# Patient Record
Sex: FEMALE | Race: WHITE | Employment: STUDENT | URBAN - METROPOLITAN AREA
[De-identification: names, ages, dates, MRNs, and addresses within clinical notes are randomized per-mention and may not be internally consistent; named-entity substitution may affect disease eponyms.]

---
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
## 2024-02-01 ENCOUNTER — VIRTUAL PHONE E/M (OUTPATIENT)
Dept: INTERNAL MEDICINE CLINIC | Facility: CLINIC | Age: 19
End: 2024-02-01

## 2024-02-01 ENCOUNTER — TELEPHONE (OUTPATIENT)
Dept: INTERNAL MEDICINE CLINIC | Facility: CLINIC | Age: 19
End: 2024-02-01

## 2024-02-01 DIAGNOSIS — J01.00 ACUTE NON-RECURRENT MAXILLARY SINUSITIS: Primary | ICD-10-CM

## 2024-02-01 PROCEDURE — 99441 PHONE E/M BY PHYS 5-10 MIN: CPT | Performed by: INTERNAL MEDICINE

## 2024-02-01 RX ORDER — AMOXICILLIN 500 MG/1
500 CAPSULE ORAL 3 TIMES DAILY
Qty: 21 CAPSULE | Refills: 0 | Status: SHIPPED | OUTPATIENT
Start: 2024-02-01

## 2024-02-01 NOTE — TELEPHONE ENCOUNTER
Needs note for school stating she was seen today and is currently being treated with medication-has been ill since 1/29/24    Please call mother Dayan @ [phone] when ready for pickup

## 2024-02-01 NOTE — PROGRESS NOTES
Subjective:   Patient ID: Grace Ace is a 19 year old female.    Cough  This is a new problem. The current episode started in the past 7 days. The problem has been gradually worsening. The problem occurs every few minutes. The cough is Productive of sputum. Associated symptoms include nasal congestion and a sore throat. Pertinent negatives include no chills, postnasal drip, rash, rhinorrhea, shortness of breath, sweats, weight loss or wheezing. The symptoms are aggravated by lying down. She has tried rest for the symptoms.       History/Other:   Review of Systems   Constitutional:  Negative for chills and weight loss.   HENT:  Positive for sore throat. Negative for postnasal drip and rhinorrhea.    Respiratory:  Positive for cough. Negative for shortness of breath and wheezing.    Skin:  Negative for rash.   All other systems reviewed and are negative.    Current Outpatient Medications   Medication Sig Dispense Refill    methylPREDNISolone (MEDROL) 4 MG Oral Tablet Therapy Pack As directed. 1 each 0     Allergies:  Allergies   Allergen Reactions    Pineapple Coughing, ITCHING and Tightness in Throat       Objective:   Physical Exam  Constitutional:       General: She is not in acute distress.  Pulmonary:      Comments: speaks in full sentences w/o distress            Assessment & Plan:   1. Acute non-recurrent maxillary sinusitis    - finish amox as ordered    No orders of the defined types were placed in this encounter.      Meds This Visit:  Requested Prescriptions      No prescriptions requested or ordered in this encounter       Imaging & Referrals:  None

## 2024-03-27 ENCOUNTER — OFFICE VISIT (OUTPATIENT)
Dept: INTERNAL MEDICINE CLINIC | Facility: CLINIC | Age: 19
End: 2024-03-27

## 2024-03-27 VITALS
OXYGEN SATURATION: 97 % | TEMPERATURE: 97 F | DIASTOLIC BLOOD PRESSURE: 82 MMHG | SYSTOLIC BLOOD PRESSURE: 120 MMHG | RESPIRATION RATE: 16 BRPM | HEIGHT: 69 IN | HEART RATE: 81 BPM | BODY MASS INDEX: 33.33 KG/M2 | WEIGHT: 225 LBS

## 2024-03-27 DIAGNOSIS — R19.7 NAUSEA VOMITING AND DIARRHEA: Primary | ICD-10-CM

## 2024-03-27 DIAGNOSIS — R10.821 RIGHT UPPER QUADRANT ABDOMINAL TENDERNESS WITH REBOUND TENDERNESS: ICD-10-CM

## 2024-03-27 DIAGNOSIS — R11.2 NAUSEA VOMITING AND DIARRHEA: Primary | ICD-10-CM

## 2024-03-27 PROCEDURE — 99214 OFFICE O/P EST MOD 30 MIN: CPT | Performed by: NURSE PRACTITIONER

## 2024-03-27 NOTE — PROGRESS NOTES
HPI:    Patient ID: Grace Ace is a 19 year old female.    Chief Complaint   Patient presents with    Stomach Pain     Diarrhea, vomiting after eating, nauseous   Aug 2023         Goes to [employer]. When away at school and eating in the cafeteria she is getting nauseated, vomiting. She will have diarrhea when at school. When she is at home normal GI. No vomiting, no diarrhea.         Review of Systems      History reviewed. No pertinent past medical history.  History reviewed. No pertinent surgical history.  History reviewed. No pertinent family history.  Social History     Socioeconomic History    Marital status: Single   Tobacco Use    Smoking status: Never    Smokeless tobacco: Never   Vaping Use    Vaping Use: Never used   Substance and Sexual Activity    Alcohol use: Never          No current outpatient medications on file.     Allergies:  Allergies   Allergen Reactions    Pineapple Coughing, ITCHING and Tightness in Throat       Lab Results   Component Value Date    GLU 86 12/21/2017    BUN 8 12/21/2017    CREATSERUM 0.49 (L) 12/21/2017     12/21/2017    CA 8.8 (L) 12/21/2017    ALKPHO 153 12/21/2017    AST 21 12/21/2017    ALT 21 12/21/2017    BILT 0.3 12/21/2017    TP 7.5 12/21/2017    ALB 3.8 12/21/2017     12/21/2017    K 3.7 12/21/2017     12/21/2017    CO2 21.0 (L) 12/21/2017     Lab Results   Component Value Date    WBC 14.1 (H) 12/21/2017    RBC 4.79 12/21/2017    HGB 13.4 12/21/2017    HCT 39.7 12/21/2017    MCV 82.9 12/21/2017    MCH 28.0 12/21/2017    MCHC 33.8 12/21/2017    RDW 12.7 12/21/2017    .0 12/21/2017     No results found for: \"CHOLEST\", \"TRIG\", \"HDL\", \"LDL\", \"VLDL\", \"TCHDLRATIO\", \"NONHDLC\", \"CHOLHDLRATIO\", \"CALCNONHDL\"  No results found for: \"EAG\", \"A1C\"  No results found for: \"T4F\", \"TSH\", \"TSHT4\"  No results found for: \"VITD\", \"QVITD\", \"DTKS80UI\"  No results found for: \"CRISTAL\"  No results found for: \"FOLIC\", \"FOLATESER\", \"FOLATE\"  No  results found for: \"IRON\", \"IRONTOT\"  No results found for: \"B12\", \"VITB12\"  No results found for: \"PHOS\", \"PHOSPHORUS\"  No results found for: \"MG\"     PHYSICAL EXAM:   /82   Pulse 81   Temp 97 °F (36.1 °C)   Resp 16   Ht 5' 9\" (1.753 m)   Wt 225 lb (102.1 kg)   LMP 03/09/2024 (Exact Date)   SpO2 97%   BMI 33.23 kg/m²   Physical Exam  Vitals and nursing note reviewed.   Constitutional:       Appearance: She is obese.   Cardiovascular:      Rate and Rhythm: Normal rate and regular rhythm.      Pulses: Normal pulses.   Pulmonary:      Effort: Pulmonary effort is normal. No respiratory distress.      Breath sounds: Normal breath sounds.   Abdominal:      General: There is no distension.      Palpations: Abdomen is soft.      Tenderness: There is abdominal tenderness in the right upper quadrant.   Neurological:      Mental Status: She is alert.   Psychiatric:         Mood and Affect: Mood normal.              ASSESSMENT/PLAN:   Diagnoses and all orders for this visit:    Nausea vomiting and diarrhea  -     US ABDOMEN COMPLETE (CPT=76700); Future    Right upper quadrant abdominal tenderness with rebound tenderness  -     US ABDOMEN COMPLETE (CPT=76700); Future        JULIANA Rashid

## 2025-06-04 ENCOUNTER — OFFICE VISIT (OUTPATIENT)
Dept: INTERNAL MEDICINE CLINIC | Facility: CLINIC | Age: 20
End: 2025-06-04

## 2025-06-04 VITALS
HEART RATE: 112 BPM | OXYGEN SATURATION: 100 % | RESPIRATION RATE: 18 BRPM | WEIGHT: 220 LBS | HEIGHT: 69 IN | DIASTOLIC BLOOD PRESSURE: 68 MMHG | SYSTOLIC BLOOD PRESSURE: 104 MMHG | TEMPERATURE: 97 F | BODY MASS INDEX: 32.58 KG/M2

## 2025-06-04 DIAGNOSIS — Z12.4 CERVICAL CANCER SCREENING: ICD-10-CM

## 2025-06-04 DIAGNOSIS — Z01.84 IMMUNITY STATUS TESTING: ICD-10-CM

## 2025-06-04 DIAGNOSIS — Z00.00 ANNUAL PHYSICAL EXAM: Primary | ICD-10-CM

## 2025-06-04 PROCEDURE — 99395 PREV VISIT EST AGE 18-39: CPT | Performed by: NURSE PRACTITIONER

## 2025-06-04 NOTE — PROGRESS NOTES
The following individual(s) verbally consented to be recorded using ambient AI listening technology and understand that they can each withdraw their consent to this listening technology at any point by asking the clinician to turn off or pause the recording:    Patient name: Grace Ivonemaurice Ace  Additional names:  None

## 2025-06-04 NOTE — PROGRESS NOTES
Wellness Exam    CC: Patient is presenting for a wellness exam    HPI:   Concerns: starting nursing clinicals! Paperwork completed today  History of Present Illness  Grace Ace is a 20 year old female who presents for a routine physical exam and immunization titers for clinical placement.    She requires immunization titers for measles, mumps, varicella, TB, hepatitis B, Tdap, and polio. Due to an allergy to the pertussis component of the Tdap vaccine, she receives the DT vaccine instead, with the last dose in May 2017. Her menstrual cycles have been irregular for 18 months, but she is not sexually active and has no urinary or vaginal symptoms. Increased earwax production is noted since an ear infection a year ago, without hearing loss. She experiences no numbness, tingling, swelling, or orthopedic issues. She is a student at [employer], maintaining academic performance within program requirements.        Pertinent Family History: Family History[1]     Gyne:   refer to gyne  Health Maintenance   Topic Date Due    Pap Smear  Never done            Denies pelvic pain, abnormal discharge or genital lesions. Irregular periods        Reported Health: Good.  Diet is general, exercise: routine.    Past Medical History[2]  Past Surgical History[3]  Short Social Hx on File[4]  Medications Ordered Prior to Encounter[5]    Review of Systems   Constitutional: Negative for fever, chills and fatigue.   HENT: Negative for hearing loss, congestion, sore throat and neck pain.    Eyes: Negative for pain and visual disturbance.   Respiratory: Negative for cough and shortness of breath.    Cardiovascular: Negative for chest pain and palpitations.   Gastrointestinal: Negative for nausea, vomiting, abdominal pain and diarrhea.   Genitourinary: Negative for urgency, frequency of urination, and abnormal vaginal bleeding.   Musculoskeletal: Negative for arthralgias and gait problem.   Skin: Negative for color  change and rash.   Neurological: Negative for tremors, weakness and numbness.   Hematological: Negative for adenopathy. Does not bruise/bleed easily.   Psychiatric/Behavioral: Negative for confusion and agitation. The patient is not nervous/anxious.      /68   Pulse 112   Temp 97.1 °F (36.2 °C) (Temporal)   Resp 18   Ht 5' 9\" (1.753 m)   Wt 220 lb (99.8 kg)   LMP 05/27/2025   SpO2 100%   BMI 32.49 kg/m²   Physical Exam   Constitutional: She is oriented to person, place, and time. She appears well-developed. No distress.   Head: Normocephalic and atraumatic.   Eyes: EOM are normal. Pupils are equal, round, and reactive to light. No scleral icterus. Fundoscopic exam: No hemorrhages, A/V nicking, exudates or papilledema.  ENT: TM's clear, nose normal, no oropharyngeal exudates or tonsillar hypertrophy    Neck: Normal range of motion. No thyromegaly present.   Cardiovascular: Normal rate, regular rhythm and normal heart sounds.  No murmur or friction rub heard.  Pulmonary/Chest: Effort normal and breath sounds normal bilaterally. She has no wheezes or rales.   Breasts: defer to gyne  Abdominal: Soft. Bowel sounds are normal. There is no tenderness. No HSM.  Musculoskeletal: Normal range of motion. She exhibits no edema.   Lymphadenopathy: She has no cervical, supraclavicular, or axillary adenopathy.   : defer to gyne  Neurological: She is alert and oriented to person, place, and time. DTRs are +2 and symmetric. Cranial nerves grossly intact.  Skin: Skin is warm. No rash noted. No erythema, pallor or jaundice.   Psychiatric: She has a normal mood and affect and her behavior is normal.     Assessment and Plan:  Grace Ace is a 20 year old female here for a wellness exam.  Age appropriate cancer screening, labs, safety, immunizations were discussed with the patient and ordered as follows:      Orders Placed This Encounter   Procedures    Varicella IgG ([employer] Titer) [E]     Standing Status:    Future     Expected Date:   6/4/2025     Expiration Date:   6/4/2026     Release to patient:   Immediate    Immune Status Panel,MMR     Standing Status:   Future     Expected Date:   6/4/2025     Expiration Date:   6/4/2026     Release to patient:   Immediate    Quantiferon TB Plus     Standing Status:   Future     Expected Date:   6/4/2025     Expiration Date:   6/4/2026     Release to patient:   Immediate    Hepatitis B Surface Antibody [E]     Standing Status:   Future     Expected Date:   6/4/2025     Expiration Date:   6/4/2026     Release to patient:   Immediate    CBC With Differential With Platelet     Standing Status:   Future     Expected Date:   6/4/2025     Expiration Date:   6/4/2026    Comp Metabolic Panel (14)     Standing Status:   Future     Expected Date:   6/4/2025     Expiration Date:   5/30/2026    Lipid Panel     Standing Status:   Future     Expected Date:   6/4/2025     Expiration Date:   5/30/2026    TSH W Reflex To Free T4     Standing Status:   Future     Expected Date:   6/4/2025     Expiration Date:   5/30/2026    Urinalysis, Routine     Standing Status:   Future     Expected Date:   6/4/2025     Expiration Date:   6/4/2026         recommend regular cardiovascular and weight bearing exercise as well as a well-rounded diet.    Her 5 year prevention plan includes: annual physical and labs, 30 minutes exercise most days of week and heart healthy diet  Patient/Caregiver Education:  Patient/Caregiver Education: There are no barriers to learning. Medical education done.  Outcome: Patient verbalizes understanding.       Educated by: CAMELIA Pina          [1] History reviewed. No pertinent family history.  [2] History reviewed. No pertinent past medical history.  [3] History reviewed. No pertinent surgical history.  [4]   Social History  Socioeconomic History    Marital status: Single   Tobacco Use    Smoking status: Never    Smokeless tobacco: Never   Vaping Use    Vaping status: Never  Used   Substance and Sexual Activity    Alcohol use: Yes     Comment: Occasionally    Drug use: Never   [5]   No current outpatient medications on file prior to visit.     No current facility-administered medications on file prior to visit.

## 2025-06-24 ENCOUNTER — PATIENT MESSAGE (OUTPATIENT)
Dept: INTERNAL MEDICINE CLINIC | Facility: CLINIC | Age: 20
End: 2025-06-24

## 2025-06-26 NOTE — TELEPHONE ENCOUNTER
Lab results received via fax  Reviewed by Melissa DENISE  Patient is immune to MMR, Varicella, need Hep B series, not immune to hep B  TB screening is negative  Blood count, glucose, kidney, liver, electrolytes, protein levels, lipid, thyroid all WNL  Patient notified, stated she will hold on to receiving Hep B series, still talking to her college and may sign waiver to not receive it since she already had series and not still not immune  Form completed for school and place in front office for     Melissa DENISE was updated on Patient refused Hep B series at this time

## 2025-07-02 ENCOUNTER — NURSE ONLY (OUTPATIENT)
Dept: INTERNAL MEDICINE CLINIC | Facility: CLINIC | Age: 20
End: 2025-07-02

## 2025-07-02 DIAGNOSIS — Z23 NEED FOR HEPATITIS B VACCINATION: Primary | ICD-10-CM

## 2025-07-02 PROCEDURE — 90471 IMMUNIZATION ADMIN: CPT | Performed by: INTERNAL MEDICINE

## 2025-07-02 PROCEDURE — 90746 HEPB VACCINE 3 DOSE ADULT IM: CPT | Performed by: INTERNAL MEDICINE

## 2025-07-02 NOTE — PROGRESS NOTES
Patient states she is here to receive Hepatitis B vaccine booster. Vaccine administered to right deltoid, patient tolerated well.

## 2025-08-29 ENCOUNTER — PATIENT MESSAGE (OUTPATIENT)
Dept: INTERNAL MEDICINE CLINIC | Facility: CLINIC | Age: 20
End: 2025-08-29

## (undated) NOTE — LETTER
05/10/23      Regis Hansen  : 2005        The above patient was seen for a medical condition on May 6th. Please excuse patient from work May 5th-May 11th. The above patient may return to work duty on Friday, May 12th.         Sincerely,    Sasha Ratliff PA-C

## (undated) NOTE — LETTER
Date: 2/1/2024    Patient Name: Grace Ace    YOB: 2005      To Whom it may concern:    The above patient was seen at the [facility] for treatment of a medical condition.    This patient should be excused from attending school from 1/29/24 through 2/3/24.    The patient may return to school on 2/4/24 with the following limitations none.        Sincerely,        Eloy Rockwell MD

## (undated) NOTE — LETTER
Date: 3/27/2024    Patient Name: Grace Ace  1/20/2005    To Whom it may concern:    The above patient was seen at [facility] for treatment of a medical condition. Due to her medical condition she should be given priority for easy access to personal preparation of meals.     Sincerely,    JULIANA Rashid

## (undated) NOTE — LETTER
21    Grace Nagimadelaine RILEY 2005     To Whom it May Concern,    The above patient is currently under my medical care. She was seen for an annual physical today, 21.          Sincerely                 Sasha Ratliff PA-C

## (undated) NOTE — LETTER
09/11/23      Rola Lopez  01/20/2005        The above patient was seen for a medical condition. She may return to school with a tentative return date of 09/12 or 09/13 and will be required to wear a mask until 09/14.     Sincerely,    CAMELIA Velazquez

## (undated) NOTE — LETTER
22      Grace RILEY 2005      To Whom it may Concern: The above patient was seen today 22 for a general physical examination. She is considered in good health at this time.           Sincerely,                 Sasha Ratliff PA-C

## (undated) NOTE — LETTER
Date: 2/1/2024    Patient Name: Grace Ace    YOB: 2005        To Whom it may concern:    The above patient was seen at the [facility] for treatment of a medical condition.    This patient should be excused from attending school from 1/29/24 through 2/1/24.    The patient may return to school on 2/2/24 with the following limitations none.        Sincerely,        Eloy Rockwell MD

## (undated) NOTE — ED AVS SNAPSHOT
Kwadwo Espinal   MRN: [id number]    Department:  [facility] Emergency Department   Date of Visit:  12/21/2017           Disclosure     Insurance plans vary and the physician(s) referred by the ER may not be covered by your plan.  Please contact you tell this physician (or your personal doctor if your instructions are to return to your personal doctor) about any new or lasting problems. The primary care or specialist physician will see patients referred from the [facility] Emergency Department.  Jeyson Erwin

## (undated) NOTE — LETTER
22      Grace Stallworth   2005       To Whom it may Concern: The above patient is currently under my medical care. She currently suffers from a medical condition that causes episodic nausea and vomiting. This medical condition is not contagious and she should not be restricted from work at this time.          Sincerely,             Sasha Ratliff PA-C

## (undated) NOTE — LETTER
Date: 11/8/2023    Patient Name: Ashely Lauren  1/20/2005    To Whom it may concern: The above patient was seen at the [facility] for treatment of a medical condition. This patient should be excused from attending school through November 10, 2023. Please contact my office with any questions or concerns at [phone].         Sincerely,    JULIANA Costa